# Patient Record
Sex: FEMALE | Race: WHITE | NOT HISPANIC OR LATINO | URBAN - METROPOLITAN AREA
[De-identification: names, ages, dates, MRNs, and addresses within clinical notes are randomized per-mention and may not be internally consistent; named-entity substitution may affect disease eponyms.]

---

## 2018-10-03 ENCOUNTER — APPOINTMENT (RX ONLY)
Dept: URBAN - METROPOLITAN AREA CLINIC 157 | Facility: CLINIC | Age: 18
Setting detail: DERMATOLOGY
End: 2018-10-03

## 2018-10-03 DIAGNOSIS — L60.9 NAIL DISORDER, UNSPECIFIED: ICD-10-CM

## 2018-10-03 PROCEDURE — ? COUNSELING

## 2018-10-03 PROCEDURE — ? TREATMENT REGIMEN

## 2018-10-03 PROCEDURE — 99202 OFFICE O/P NEW SF 15 MIN: CPT

## 2018-10-03 PROCEDURE — ? NAIL CLIPPING FOR PAS

## 2018-10-03 ASSESSMENT — LOCATION SIMPLE DESCRIPTION DERM: LOCATION SIMPLE: LEFT GREAT TOE

## 2018-10-03 ASSESSMENT — LOCATION ZONE DERM: LOCATION ZONE: TOENAIL

## 2018-10-03 ASSESSMENT — LOCATION DETAILED DESCRIPTION DERM: LOCATION DETAILED: LEFT GREAT TOENAIL

## 2018-10-03 NOTE — PROCEDURE: NAIL CLIPPING FOR PAS
Lab: [id number]
Detail Level: Detailed
Billing Type: Third-Party Bill
Lab Facility: [id number]
Add [id number] To Bill?: No

## 2018-10-03 NOTE — PROCEDURE: TREATMENT REGIMEN
Plan: Informed patient to leg toe nails grow out and clip her toenail in a u-shaped. Will determine further treatment after nail clipping comes back.
Detail Level: Zone

## 2022-05-04 ENCOUNTER — APPOINTMENT (RX ONLY)
Dept: URBAN - METROPOLITAN AREA CLINIC 156 | Facility: CLINIC | Age: 22
Setting detail: DERMATOLOGY
End: 2022-05-04

## 2022-05-04 VITALS — WEIGHT: 210 LBS | HEIGHT: 66 IN

## 2022-05-04 DIAGNOSIS — M79.3 PANNICULITIS, UNSPECIFIED: ICD-10-CM | Status: INADEQUATELY CONTROLLED

## 2022-05-04 PROCEDURE — ? COUNSELING

## 2022-05-04 PROCEDURE — 99202 OFFICE O/P NEW SF 15 MIN: CPT

## 2022-05-04 PROCEDURE — ? DIAGNOSIS COMMENT

## 2022-05-04 ASSESSMENT — LOCATION DETAILED DESCRIPTION DERM: LOCATION DETAILED: RIGHT PROXIMAL PRETIBIAL REGION

## 2022-05-04 ASSESSMENT — LOCATION ZONE DERM: LOCATION ZONE: LEG

## 2022-05-04 ASSESSMENT — LOCATION SIMPLE DESCRIPTION DERM: LOCATION SIMPLE: RIGHT PRETIBIAL REGION

## 2022-05-04 NOTE — PROCEDURE: MIPS QUALITY
Quality 111:Pneumonia Vaccination Status For Older Adults: Pneumococcal Vaccination not Administered or Previously Received, Reason not Otherwise Specified
Quality 431: Preventive Care And Screening: Unhealthy Alcohol Use - Screening: Patient not identified as an unhealthy alcohol user when screened for unhealthy alcohol use using a systematic screening method
Quality 110: Preventive Care And Screening: Influenza Immunization: Influenza immunization was not ordered or administered, reason not given
Quality 226: Preventive Care And Screening: Tobacco Use: Screening And Cessation Intervention: Patient screened for tobacco use and is an ex/non-smoker
Additional Notes: Vaccinated
Detail Level: Detailed

## 2022-05-04 NOTE — PROCEDURE: DIAGNOSIS COMMENT
Detail Level: Simple
Comment: Patient will considering having an injection to affected area. She will call and schedule an appointment when she is ready.
Render Risk Assessment In Note?: yes